# Patient Record
Sex: FEMALE | URBAN - METROPOLITAN AREA
[De-identification: names, ages, dates, MRNs, and addresses within clinical notes are randomized per-mention and may not be internally consistent; named-entity substitution may affect disease eponyms.]

---

## 2021-12-24 ENCOUNTER — HOSPITAL ENCOUNTER (EMERGENCY)
Facility: HOSPITAL | Age: 71
Discharge: HOME | End: 2021-12-24
Attending: EMERGENCY MEDICINE
Payer: MEDICARE

## 2021-12-24 VITALS
WEIGHT: 190 LBS | DIASTOLIC BLOOD PRESSURE: 57 MMHG | HEIGHT: 67 IN | TEMPERATURE: 98 F | OXYGEN SATURATION: 99 % | RESPIRATION RATE: 15 BRPM | SYSTOLIC BLOOD PRESSURE: 119 MMHG | HEART RATE: 63 BPM | BODY MASS INDEX: 29.82 KG/M2

## 2021-12-24 DIAGNOSIS — S62.394A CLOSED NONDISPLACED FRACTURE OF OTHER PART OF FOURTH METACARPAL BONE OF RIGHT HAND, INITIAL ENCOUNTER: Primary | ICD-10-CM
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
## 2021-12-24 PROCEDURE — 29125 APPL SHORT ARM SPLINT STATIC: CPT | Mod: RT,,, | Performed by: EMERGENCY MEDICINE

## 2021-12-24 PROCEDURE — 29125 PR APPLY FOREARM SPLINT,STATIC: ICD-10-PCS | Mod: RT,,, | Performed by: EMERGENCY MEDICINE

## 2021-12-24 PROCEDURE — 25000003 PHARM REV CODE 250: Performed by: PHYSICIAN ASSISTANT

## 2021-12-24 PROCEDURE — 99283 EMERGENCY DEPT VISIT LOW MDM: CPT | Mod: 25

## 2021-12-24 PROCEDURE — 99284 PR EMERGENCY DEPT VISIT,LEVEL IV: ICD-10-PCS | Mod: 25,,, | Performed by: EMERGENCY MEDICINE

## 2021-12-24 PROCEDURE — 29125 APPL SHORT ARM SPLINT STATIC: CPT | Mod: RT

## 2021-12-24 PROCEDURE — 99284 EMERGENCY DEPT VISIT MOD MDM: CPT | Mod: 25,,, | Performed by: EMERGENCY MEDICINE

## 2021-12-24 RX ORDER — SULFAMETHOXAZOLE AND TRIMETHOPRIM 800; 160 MG/1; MG/1
1 TABLET ORAL 2 TIMES DAILY
Qty: 14 TABLET | Refills: 0 | Status: SHIPPED | OUTPATIENT
Start: 2021-12-24 | End: 2021-12-31

## 2021-12-24 RX ORDER — RALOXIFENE HYDROCHLORIDE 60 MG/1
60 TABLET, FILM COATED ORAL DAILY
COMMUNITY

## 2021-12-24 RX ORDER — IBUPROFEN 600 MG/1
600 TABLET ORAL
Status: COMPLETED | OUTPATIENT
Start: 2021-12-24 | End: 2021-12-24

## 2021-12-24 RX ADMIN — IBUPROFEN 600 MG: 600 TABLET, FILM COATED ORAL at 12:12

## 2021-12-24 NOTE — ED TRIAGE NOTES
"Aleisha Squires, a 71 y.o. female presents to the ED w/ complaint of fall. Pt states she was walking her dog and "tripped over the sidewalk." Denies LOC/blood thinners. Pt states she hit her right face on the ground. Bruising noted around right eye. Pt states she put out her right hand to break her fall and now has 8/10 right arm/hand pain. Pt reports decreased ROM to right arm. Pt states she is unable to move right ring finger and pinkie finger. Denies chest pain/SOB. Denies n/v/d.     Triage note:  Chief Complaint   Patient presents with    Fall     Fall while walking dog reports of R hand pain     Review of patient's allergies indicates:  Not on File  No past medical history on file.    Patient identifiers verified and correct for Aleisha Squires    LOC: The patient is awake, alert, and aware of environment. The patient is AOX4 and speaking appropriately.   APPEARANCE: No acute distress noted. Pt reports pain of 8/10 to right arm.  HEENT: WDL, PERRLA  PSYCHOSOCIAL: Patient is calm and cooperative. Denies SI/HI.  SKIN: The skin is warm, dry, color consistent with ethnicity. Laceration noted to right hand. Bruising noted around right eye.  RESPIRATORY: Airway is open and patent. Bilateral chest rise and fall. Respiratory rate even and unlabored.  No accessory muscle use noted.  CARDIAC: Patient has a normal rate and rhythm. No complaints of chest pain.  ABDOMEN/GI: Soft, non tender. No distention noted. Denies n/v/d.   URINARY:  Voids independently without difficulty. No complaints of frequency, urgency, burning, or blood in urine.   NEUROLOGIC: Eyes open spontaneously. Speech clear.  Able to follow commands, demonstrating ability to actively and appropriately communicate within context of current conversation. Symmetrical facial muscles. Movement is purposeful. Denies dizziness/lightheadedness.  MUSCULOSKELETAL: No obvious deformities noted. Decreased ROM to right arm and hand. Pt reports she is unable to move right " ring and pinkie fingers.  PERIPHERAL VASCULAR: Cap refill <3 secs bilaterally. No peripheral edema noted. Denies numbness and tingling in extremities.

## 2021-12-24 NOTE — ED PROVIDER NOTES
Encounter Date: 12/24/2021       History     Chief Complaint   Patient presents with    Fall     Fall while walking dog reports of R hand pain     70 y/o F with no known medical history presents to the ED c/o R hand/arm pain after a fall this morning.  She was walking the dogs when she tripped and fell on a R outstretched arm. She did strike her R face on the ground but denies any LOC.  She is R hand dominant.  At rest, she reports her pain is 7/10 to the R hand/wrist.  She has not taken any OTC pain medication prior to arrival.  She has not applied any ice.  She does report some nausea secondary to pain. She denies f/c, chest pain, SOB, neck pain, headache.     The history is provided by the patient.     Review of patient's allergies indicates:  No Known Allergies  History reviewed. No pertinent past medical history.  History reviewed. No pertinent surgical history.  History reviewed. No pertinent family history.  Social History     Tobacco Use    Smoking status: Never Smoker    Smokeless tobacco: Never Used   Substance Use Topics    Alcohol use: Yes     Comment: occasionally    Drug use: Never     Review of Systems   Constitutional: Negative for chills and fever.   HENT: Negative for congestion, rhinorrhea and sore throat.    Eyes: Negative for photophobia and visual disturbance.   Respiratory: Negative for cough and shortness of breath.    Cardiovascular: Negative for chest pain.   Gastrointestinal: Positive for nausea. Negative for abdominal pain, constipation, diarrhea and vomiting.   Genitourinary: Negative for dysuria and hematuria.   Musculoskeletal: Positive for arthralgias and myalgias. Negative for back pain.   Skin: Negative for rash.   Neurological: Negative for weakness, light-headedness, numbness and headaches.   Psychiatric/Behavioral: Negative for confusion.       Physical Exam     Initial Vitals [12/24/21 1157]   BP Pulse Resp Temp SpO2   (!) 119/57 63 15 98 °F (36.7 °C) 99 %      MAP       --          Physical Exam    Nursing note and vitals reviewed.  Constitutional: She appears well-developed and well-nourished. She is not diaphoretic. No distress.   HENT:   Head: Normocephalic and atraumatic.   Bruising/abrasion to chin, R zygomatic arch with some tenderness.    Neck: Neck supple.   Normal range of motion.  Cardiovascular: Normal rate, regular rhythm and normal heart sounds. Exam reveals no gallop and no friction rub.    No murmur heard.  Pulmonary/Chest: Breath sounds normal. She has no wheezes. She has no rhonchi. She has no rales.   Abdominal: Abdomen is soft. Bowel sounds are normal. There is no abdominal tenderness. There is no rebound and no guarding.   Musculoskeletal:         General: Tenderness and edema present. Normal range of motion.      Cervical back: Normal range of motion and neck supple.      Comments: Swelling and tenderness noted to the R hand. R radial pulse 2+. Normal sensation. No bony tenderness to the R shoulder, humerus, elbow or forearm. No midline C, T or L spine tenderness. There is a small abrasion noted to the palm of the hand at 5th MCP.     Neurological: She is alert and oriented to person, place, and time.   Skin: Skin is warm and dry. No rash noted. No erythema.   Psychiatric: She has a normal mood and affect.         ED Course   Splint Application    Date/Time: 12/24/2021 2:50 PM  Performed by: Angela Hoang PA-C  Authorized by: Idalmis Mead MD   Location details: right hand  Splint type: ulnar gutter  Supplies used: plaster  Post-procedure: The splinted body part was neurovascularly unchanged following the procedure.  Patient tolerance: Patient tolerated the procedure well with no immediate complications        Labs Reviewed - No data to display       Imaging Results          X-Ray Wrist Complete Right (Final result)  Result time 12/24/21 13:41:25    Final result by Maximino Brown MD (12/24/21 13:41:25)                 Impression:      Acute mild displaced  and angulated fracture at the 4th digit metacarpal head neck junction.    Additionally, there is suggested mild deformity of the 5th digit metacarpal head neck junction without discrete fracture lines, which could relate to additional age-indeterminate fracture.    No definite acute displaced fractures noted elsewhere.      Electronically signed by: Maximino Brown  Date:    12/24/2021  Time:    13:41             Narrative:    EXAMINATION:  XR FOREARM RIGHT; XR WRIST COMPLETE 3 VIEWS RIGHT; XR HAND COMPLETE 3 VIEW RIGHT    CLINICAL HISTORY:  FOOSH;Unspecified fall, initial encounter    TECHNIQUE:  AP and lateral views of the right forearm were performed.    Three views of the right hand.    Three views of the right wrist.    COMPARISON:  None    FINDINGS:  Forearm: No definite evidence of acute fracture or dislocation.  Visualized joint spaces appear maintained.  No definite radiopaque foreign body visualized.    Wrist: No definite evidence of acute fracture or dislocation at the wrist.  Mild DJD at the thumb CMC is noted.  Mild narrowing of the radiocarpal joint.  No definite radiopaque foreign body seen.    Hand: Acute obliquely oriented fracture at the 4th digit metacarpal head neck junction.  There is ulnar angulation of the distal fracture fragment.  Additionally, there is suggested mild deformity of the 5th digit metacarpal head neck junction without discrete fracture lines, which could relate to additional age-indeterminate fracture.    Joint spaces maintained.  Mild DJD of the thumb CMC and at the interphalangeal joints.  Mild degenerative narrowing of the radiocarpal joint.  No definite radiopaque foreign body is seen.                               X-Ray Hand 3 view Right (Final result)  Result time 12/24/21 13:41:25    Final result by Maximino Brown MD (12/24/21 13:41:25)                 Impression:      Acute mild displaced and angulated fracture at the 4th digit metacarpal head neck  junction.    Additionally, there is suggested mild deformity of the 5th digit metacarpal head neck junction without discrete fracture lines, which could relate to additional age-indeterminate fracture.    No definite acute displaced fractures noted elsewhere.      Electronically signed by: Maximino Brown  Date:    12/24/2021  Time:    13:41             Narrative:    EXAMINATION:  XR FOREARM RIGHT; XR WRIST COMPLETE 3 VIEWS RIGHT; XR HAND COMPLETE 3 VIEW RIGHT    CLINICAL HISTORY:  FOOSH;Unspecified fall, initial encounter    TECHNIQUE:  AP and lateral views of the right forearm were performed.    Three views of the right hand.    Three views of the right wrist.    COMPARISON:  None    FINDINGS:  Forearm: No definite evidence of acute fracture or dislocation.  Visualized joint spaces appear maintained.  No definite radiopaque foreign body visualized.    Wrist: No definite evidence of acute fracture or dislocation at the wrist.  Mild DJD at the thumb CMC is noted.  Mild narrowing of the radiocarpal joint.  No definite radiopaque foreign body seen.    Hand: Acute obliquely oriented fracture at the 4th digit metacarpal head neck junction.  There is ulnar angulation of the distal fracture fragment.  Additionally, there is suggested mild deformity of the 5th digit metacarpal head neck junction without discrete fracture lines, which could relate to additional age-indeterminate fracture.    Joint spaces maintained.  Mild DJD of the thumb CMC and at the interphalangeal joints.  Mild degenerative narrowing of the radiocarpal joint.  No definite radiopaque foreign body is seen.                               X-Ray Forearm Right (Final result)  Result time 12/24/21 13:41:25    Final result by Maximino Brown MD (12/24/21 13:41:25)                 Impression:      Acute mild displaced and angulated fracture at the 4th digit metacarpal head neck junction.    Additionally, there is suggested mild deformity of the 5th digit  metacarpal head neck junction without discrete fracture lines, which could relate to additional age-indeterminate fracture.    No definite acute displaced fractures noted elsewhere.      Electronically signed by: Maximino Brown  Date:    12/24/2021  Time:    13:41             Narrative:    EXAMINATION:  XR FOREARM RIGHT; XR WRIST COMPLETE 3 VIEWS RIGHT; XR HAND COMPLETE 3 VIEW RIGHT    CLINICAL HISTORY:  FOOSH;Unspecified fall, initial encounter    TECHNIQUE:  AP and lateral views of the right forearm were performed.    Three views of the right hand.    Three views of the right wrist.    COMPARISON:  None    FINDINGS:  Forearm: No definite evidence of acute fracture or dislocation.  Visualized joint spaces appear maintained.  No definite radiopaque foreign body visualized.    Wrist: No definite evidence of acute fracture or dislocation at the wrist.  Mild DJD at the thumb CMC is noted.  Mild narrowing of the radiocarpal joint.  No definite radiopaque foreign body seen.    Hand: Acute obliquely oriented fracture at the 4th digit metacarpal head neck junction.  There is ulnar angulation of the distal fracture fragment.  Additionally, there is suggested mild deformity of the 5th digit metacarpal head neck junction without discrete fracture lines, which could relate to additional age-indeterminate fracture.    Joint spaces maintained.  Mild DJD of the thumb CMC and at the interphalangeal joints.  Mild degenerative narrowing of the radiocarpal joint.  No definite radiopaque foreign body is seen.                               CT Maxillofacial Without Contrast (Final result)  Result time 12/24/21 13:17:02    Final result by Karl Lindquist DO (12/24/21 13:17:02)                 Impression:      CT head: Unremarkable noncontrast CT head specifically without evidence for acute intracranial hemorrhage.  Clinical correlation and further evaluation as warranted.    CT maxillofacial bones: Trace induration right pre maxillary  soft tissues without evidence for underlying fracture    Otherwise unremarkable CT maxillofacial bones as detailed above.      Electronically signed by: Karl DO Lexa  Date:    12/24/2021  Time:    13:17             Narrative:    EXAMINATION:  CT HEAD WITHOUT CONTRAST; CT MAXILLOFACIAL WITHOUT CONTRAST    CLINICAL HISTORY:  Head trauma, minor (Age >= 65y);; Facial trauma, blunt;    TECHNIQUE:  CT head: Multiple sequential 5 mm axial images of the head without contrast.  Coronal and sagittal reformatted imaging from the axial acquisition.    CT maxillofacial bones: 1.25 mm axial images of the maxillofacial bones without contrast coronal and sagittal reformatted imaging from the axial acquisition.    COMPARISON:  None    FINDINGS:  CT head: There is no evidence for acute intracranial hemorrhage or sulcal effacement.  The ventricles are normal in size without hydrocephalus.  There is no midline shift or mass effect.  Visualized paranasal sinuses and mastoid air cells are clear.    CT maxillofacial bones: There is trace induration in the right pre maxillary soft tissues in light of history suggestive for sequela of traumatic injury without underlying fracture.    No evidence for acute fracture of the maxillofacial bones.  Bony orbits are intact.  Globes and extraocular muscles are symmetric and within normal limits allowing for noncontrast CT technique.    There is trace mucosal thickening in the ethmoid air cells.  In addition there is trace mucosal thickening in the maxillary antra bilaterally.    The mandible is intact without fracture or dislocation.    Incidental rightward deviation of the nasal septum with a rightward directed bony nasal spur measuring 5 mm.    There is small lobular opacity in the lateral aspect of the right sphenoid sinus may represent mucous retention cyst.  Degenerative change in the visualized cervical spine.                               CT Head Without Contrast (Final result)  Result  time 12/24/21 13:17:02    Final result by Karl Lindquist DO (12/24/21 13:17:02)                 Impression:      CT head: Unremarkable noncontrast CT head specifically without evidence for acute intracranial hemorrhage.  Clinical correlation and further evaluation as warranted.    CT maxillofacial bones: Trace induration right pre maxillary soft tissues without evidence for underlying fracture    Otherwise unremarkable CT maxillofacial bones as detailed above.      Electronically signed by: Karl Lindquist DO  Date:    12/24/2021  Time:    13:17             Narrative:    EXAMINATION:  CT HEAD WITHOUT CONTRAST; CT MAXILLOFACIAL WITHOUT CONTRAST    CLINICAL HISTORY:  Head trauma, minor (Age >= 65y);; Facial trauma, blunt;    TECHNIQUE:  CT head: Multiple sequential 5 mm axial images of the head without contrast.  Coronal and sagittal reformatted imaging from the axial acquisition.    CT maxillofacial bones: 1.25 mm axial images of the maxillofacial bones without contrast coronal and sagittal reformatted imaging from the axial acquisition.    COMPARISON:  None    FINDINGS:  CT head: There is no evidence for acute intracranial hemorrhage or sulcal effacement.  The ventricles are normal in size without hydrocephalus.  There is no midline shift or mass effect.  Visualized paranasal sinuses and mastoid air cells are clear.    CT maxillofacial bones: There is trace induration in the right pre maxillary soft tissues in light of history suggestive for sequela of traumatic injury without underlying fracture.    No evidence for acute fracture of the maxillofacial bones.  Bony orbits are intact.  Globes and extraocular muscles are symmetric and within normal limits allowing for noncontrast CT technique.    There is trace mucosal thickening in the ethmoid air cells.  In addition there is trace mucosal thickening in the maxillary antra bilaterally.    The mandible is intact without fracture or dislocation.    Incidental rightward  deviation of the nasal septum with a rightward directed bony nasal spur measuring 5 mm.    There is small lobular opacity in the lateral aspect of the right sphenoid sinus may represent mucous retention cyst.  Degenerative change in the visualized cervical spine.                                 Medications   ibuprofen tablet 600 mg (600 mg Oral Given 12/24/21 7992)           APC / Resident Notes:   72 y/o F with no known medical history presents to the ED c/o R hand/arm pain after a fall this morning. VSS. RRR. Lungs clear. Abdomen soft. No midline C, T or L spine tenderness. Abrasions noted to the chin and R zygomatic arch. Alert and oriented. Swelling noted to the R hand and wrist with bony tenderness to the R hand. R radial pulse 2+. No bony tenderness to the R shoulder, humerus, or elbow. No bony tenderness to lower extremities.    CT head with no ICH.   CT maxillofacial with no fracture.  Xrays R wrist/hand/forearm independently reviewed - 4th metacarpal fracture.     Abrasion noted to the palm of the L hand at 5th MCP. This is not overlying fracture and I do not suspect open fracture. Abrasion cleaned. L ulnar gutter splint applied - see procedure note.     Discussed with ortho - rec po bactrim, hand clinic follow up.    I do not feel that she needs any further labs or imaging at this time. Stable for discharge.     She was discharged with a prescription for bactrim.  Declined rx for pain medication.  She will follow up with her orthopedist at home in [place].  Strict ED return precautions given.  All of the patient's questions were answered.  I reviewed the patient's chart and imaging and discussed the case with my supervising physician.          Attending Attestation:     Physician Attestation Statement for NP/PA:   I discussed this assessment and plan of this patient with the NP/PA, but I did not personally examine the patient. The face to face encounter was performed by the NP/PA.                        Clinical Impression:   Final diagnoses:  [W19.XXXA] Fall  [Q61.206L] Closed nondisplaced fracture of other part of fourth metacarpal bone of right hand, initial encounter (Primary)          ED Disposition Condition    Discharge Stable        ED Prescriptions     Medication Sig Dispense Start Date End Date Auth. Provider    sulfamethoxazole-trimethoprim 800-160mg (BACTRIM DS) 800-160 mg Tab Take 1 tablet by mouth 2 (two) times daily. for 7 days 14 tablet 12/24/2021 12/31/2021 Angela Hoang PA-C        Follow-up Information     Follow up With Specialties Details Why Contact Info Additional Information    [facility] Schedule an appointment as soon as possible for a visit   [address], [address]  [facility] [address] [phone] [facility], 9th Floor Please park in [address] and use [address] elevators           Angela Hoang PA-C  12/24/21 0770       Idalmis Mead MD  12/26/21 9743